# Patient Record
(demographics unavailable — no encounter records)

---

## 2025-02-20 NOTE — PHYSICAL EXAM
[Alert] : alert [Oriented x 3] : ~L oriented x 3 [Declined] : declined [FreeTextEntry3] : Focused exam: -few pink papules on the forehead, chin, jawline

## 2025-02-20 NOTE — ASSESSMENT
[FreeTextEntry1] : #Acne vulgaris, face, chronic, flaring - c/w cerave acne foaming cream cleanser in AM - start Retin-A 0.025% cream at night. Start off 2 nights a week and work up to nightly as tolerated, SED including dryness, irritation, risk of PIH. Cannot be used in pregnancy. Use gentle moisturizer.  - sunprotection  RTC 3 months

## 2025-02-20 NOTE — HISTORY OF PRESENT ILLNESS
[FreeTextEntry1] : RPV- acne [de-identified] : 29yo F presents for evaluation of acne here with her daughter who is also being seen acne ongoing, no prior tx did not get tretinoin covered at [facility] has hormonal IUD, does not get menstrual period

## 2025-05-07 NOTE — HISTORY OF PRESENT ILLNESS
[FreeTextEntry1] : RPV- acne [de-identified] : BRITTNEY SAMANIEGO is a 30 year old who is presenting for evaluation of:  #acne ongoing, slightly improved since [facility]. Using tret 0.025% 3 nights a week, tolerating well. Has hormonal IUD, does not get menstrual period.  Meds: sertraline, glp-1

## 2025-05-07 NOTE — ASSESSMENT
[FreeTextEntry1] : #Acne vulgaris, face, chronic, flaring - Continue cerave acne foaming cream cleanser in AM - Increase Retin-A 0.025% cream from every other night to nightly. SED including dryness, irritation, risk of PIH. Cannot be used in pregnancy. Use gentle moisturizer.  - sunprotection - Start spironolactone 50 mg nightly, if tolerated increase to 100mg nightly after 1 week. Expectation that it will take 3-6 months to see clinical effect and may need to continue to see sustained effect. Side effects discussed including menstrual irregularities, dizziness, increased urination, and breast tenderness. Less common side effects include edema and hyperkalemia, more likely in those with risk factors, amongst others. Eat a well-balanced diet and avoid excessive intake of high-potassium containing foods or excessive exercise without proper hydration. Women on spironolactone should be on contraceptives/take adequate measures to eliminate risk of conceiving while on this medication as it could cause birth defects. Patient currently not pregnant, denies personal history of renal or blood pressure issues. Patient aware to stop medication if becomes pregnant. Patient expressed understanding.  #Rhytides, crows feet - Discussed botox treatment, refer to Dr. Whitman or Dr. Hayward  RTC 3 months

## 2025-05-07 NOTE — PHYSICAL EXAM
[Alert] : alert [Oriented x 3] : ~L oriented x 3 [Declined] : declined [FreeTextEntry3] : Focused exam: -few pink papules on the forehead, chin, jawline -periorbital rhytids